# Patient Record
Sex: FEMALE | Race: WHITE
[De-identification: names, ages, dates, MRNs, and addresses within clinical notes are randomized per-mention and may not be internally consistent; named-entity substitution may affect disease eponyms.]

---

## 2019-10-06 ENCOUNTER — HOSPITAL ENCOUNTER (EMERGENCY)
Dept: HOSPITAL 1 - ED | Age: 59
Discharge: HOME | End: 2019-10-06
Payer: COMMERCIAL

## 2019-10-06 VITALS — DIASTOLIC BLOOD PRESSURE: 71 MMHG | SYSTOLIC BLOOD PRESSURE: 122 MMHG

## 2019-10-06 VITALS — HEIGHT: 62 IN | BODY MASS INDEX: 25.58 KG/M2 | WEIGHT: 139 LBS

## 2019-10-06 DIAGNOSIS — N39.0: Primary | ICD-10-CM

## 2019-10-06 DIAGNOSIS — M06.9: ICD-10-CM

## 2019-10-06 LAB
ALBUMIN SERPL-MCNC: 3.8 G/DL (ref 3.4–5)
ALP SERPL-CCNC: 69 U/L (ref 46–116)
ALT SERPL-CCNC: 57 U/L (ref 14–59)
AST SERPL-CCNC: 24 U/L (ref 15–37)
BASOPHILS NFR BLD: 0.2 % (ref 0–2)
BILIRUB SERPL-MCNC: 1 MG/DL (ref 0.2–1)
BUN SERPL-MCNC: 7 MG/DL (ref 7–18)
CALCIUM SERPL-MCNC: 8.7 MG/DL (ref 8.5–10.1)
CHLORIDE SERPL-SCNC: 103 MMOL/L (ref 98–107)
CK MB SERPL-MCNC: < 0.5 NG/ML (ref 0–3.6)
CK SERPL-CCNC: 39 U/L (ref 26–192)
CO2 SERPL-SCNC: 29 MMOL/L (ref 21–32)
CREAT SERPL-MCNC: 0.9 MG/DL (ref 0.6–1)
CRP SERPL-MCNC: < 0.2 MG/DL (ref ?–0.9)
ERYTHROCYTE [DISTWIDTH] IN BLOOD BY AUTOMATED COUNT: 12.6 % (ref 11.5–14.5)
ERYTHROCYTE [SEDIMENTATION RATE] IN BLOOD BY WESTERGREN METHOD: 8 MM/HR (ref 0–30)
GFR SERPLBLD BASED ON 1.73 SQ M-ARVRAT: > 60 ML/MIN
GLUCOSE SERPL-MCNC: 119 MG/DL (ref 74–106)
MICROSCOPIC UR-IMP: YES
PLATELET # BLD: 230 X10^3MCL (ref 130–400)
POTASSIUM SERPL-SCNC: 4.1 MMOL/L (ref 3.5–5.1)
PROT SERPL-MCNC: 6.7 G/DL (ref 6.4–8.2)
RBC # UR STRIP.AUTO: (no result) /UL
SODIUM SERPL-SCNC: 138 MMOL/L (ref 136–145)
T3 SERPL-MCNC: 1.01 NG/ML
T3RU NFR SERPL: 38 % UPTAKE (ref 30–39)
T4 FREE SERPL-MCNC: 0.98 NG/DL (ref 0.76–1.46)
T4 SERPL-MCNC: 6.8 UG/DL (ref 4.7–13.3)
T4/T3 UPTAKE INDEX SERPL: 2.6 UG/DL (ref 1.4–4.5)
UA SPECIFIC GRAVITY: <=1.005 (ref 1–1.03)

## 2019-10-07 ENCOUNTER — HOSPITAL ENCOUNTER (INPATIENT)
Dept: HOSPITAL 1 - ED | Age: 59
LOS: 2 days | Discharge: HOME | DRG: 391 | End: 2019-10-09
Attending: HOSPITALIST | Admitting: HOSPITALIST
Payer: COMMERCIAL

## 2019-10-07 VITALS
WEIGHT: 144.38 LBS | BODY MASS INDEX: 26.57 KG/M2 | WEIGHT: 144.38 LBS | BODY MASS INDEX: 26.57 KG/M2 | HEIGHT: 62 IN | HEIGHT: 62 IN

## 2019-10-07 VITALS — SYSTOLIC BLOOD PRESSURE: 125 MMHG | DIASTOLIC BLOOD PRESSURE: 73 MMHG

## 2019-10-07 VITALS — DIASTOLIC BLOOD PRESSURE: 67 MMHG | SYSTOLIC BLOOD PRESSURE: 125 MMHG

## 2019-10-07 VITALS — SYSTOLIC BLOOD PRESSURE: 117 MMHG | DIASTOLIC BLOOD PRESSURE: 66 MMHG

## 2019-10-07 VITALS — SYSTOLIC BLOOD PRESSURE: 118 MMHG | DIASTOLIC BLOOD PRESSURE: 74 MMHG

## 2019-10-07 DIAGNOSIS — E44.1: ICD-10-CM

## 2019-10-07 DIAGNOSIS — K21.9: ICD-10-CM

## 2019-10-07 DIAGNOSIS — E87.6: ICD-10-CM

## 2019-10-07 DIAGNOSIS — M06.9: ICD-10-CM

## 2019-10-07 DIAGNOSIS — K85.90: ICD-10-CM

## 2019-10-07 DIAGNOSIS — K29.80: Primary | ICD-10-CM

## 2019-10-07 DIAGNOSIS — N39.0: ICD-10-CM

## 2019-10-07 DIAGNOSIS — E83.51: ICD-10-CM

## 2019-10-07 DIAGNOSIS — D63.8: ICD-10-CM

## 2019-10-07 LAB
ALBUMIN SERPL-MCNC: 3.3 G/DL (ref 3.4–5)
ALP SERPL-CCNC: 56 U/L (ref 46–116)
ALT SERPL-CCNC: 42 U/L (ref 14–59)
AMPHETAMINES UR QL SCN: (no result)
AST SERPL-CCNC: 18 U/L (ref 15–37)
BASOPHILS NFR BLD: 0.1 % (ref 0–2)
BASOPHILS NFR BLD: 0.1 % (ref 0–2)
BILIRUB SERPL-MCNC: 0.72 MG/DL (ref 0.2–1)
BUN SERPL-MCNC: 10 MG/DL (ref 7–18)
BUN SERPL-MCNC: 11 MG/DL (ref 7–18)
CALCIUM SERPL-MCNC: 7.8 MG/DL (ref 8.5–10.1)
CALCIUM SERPL-MCNC: 8 MG/DL (ref 8.5–10.1)
CHLORIDE SERPL-SCNC: 104 MMOL/L (ref 98–107)
CHLORIDE SERPL-SCNC: 106 MMOL/L (ref 98–107)
CHOLEST SERPL-MCNC: 144 MG/DL (ref ?–200)
CHOLEST/HDLC SERPL: 4.5 MG/DL
CO2 SERPL-SCNC: 24.3 MMOL/L (ref 21–32)
CO2 SERPL-SCNC: 27.2 MMOL/L (ref 21–32)
CREAT SERPL-MCNC: 0.7 MG/DL (ref 0.6–1)
CREAT SERPL-MCNC: 0.9 MG/DL (ref 0.6–1)
CRP SERPL-MCNC: < 0.2 MG/DL (ref ?–0.9)
ERYTHROCYTE [DISTWIDTH] IN BLOOD BY AUTOMATED COUNT: 11.9 % (ref 11.5–14.5)
ERYTHROCYTE [DISTWIDTH] IN BLOOD BY AUTOMATED COUNT: 13.1 % (ref 11.5–14.5)
GFR SERPLBLD BASED ON 1.73 SQ M-ARVRAT: > 60 ML/MIN
GFR SERPLBLD BASED ON 1.73 SQ M-ARVRAT: > 60 ML/MIN
GLUCOSE SERPL-MCNC: 120 MG/DL (ref 74–106)
GLUCOSE SERPL-MCNC: 131 MG/DL (ref 74–106)
HDLC SERPL-MCNC: 32 MG/DL (ref 40–60)
LIPASE SERPL-CCNC: 1244 IU/L (ref 73–393)
MAGNESIUM SERPL-MCNC: 1.8 MG/DL (ref 1.8–2.4)
MICROSCOPIC UR-IMP: YES
PHOSPHATE SERPL-MCNC: 3.6 MG/DL (ref 2.5–4.9)
PLATELET # BLD: 181 X10^3MCL (ref 130–400)
PLATELET # BLD: 196 X10^3MCL (ref 130–400)
POTASSIUM SERPL-SCNC: 3.4 MMOL/L (ref 3.5–5.1)
POTASSIUM SERPL-SCNC: 4.4 MMOL/L (ref 3.5–5.1)
PROT SERPL-MCNC: 6 G/DL (ref 6.4–8.2)
RBC # UR STRIP.AUTO: (no result) /UL
SODIUM SERPL-SCNC: 136 MMOL/L (ref 136–145)
SODIUM SERPL-SCNC: 138 MMOL/L (ref 136–145)
TRIGL SERPL-MCNC: 98 MG/DL (ref ?–150)
UA SPECIFIC GRAVITY: 1.02 (ref 1–1.03)

## 2019-10-07 PROCEDURE — G0378 HOSPITAL OBSERVATION PER HR: HCPCS

## 2019-10-07 NOTE — NUR
ADMITTED PT FROM ER WITH C/O RLQ PAIN WITH ADMITTING DX ACUTE PANCREATITIS.HI
9/10 TO SHARP PAIN.NO N/V NOTED.PT A/O X4.DENIES HEADACHE OR
DIZZINESS.LEVAQUIN IV FROM ER STILL INFUSING./73 MMHG,HR 84.NPO AT THIS
TIME AND DISCUSSED WITH PT.ADMISSION CARE RENDERED.ORIENTATION TO ROOM AND
CALL BUTTON PROVIDED.WILL CONTINUE TO MONITOR.

## 2019-10-07 NOTE — NUR
PATIENT'S IV FLUIDS NOW AT 85 AND WILL MONITOR SITE FOR TOLERANCE. SO FAR NO
SIGNS OF INFILTRATE NOTED. WILL CONTINUE TO MONITOR.

## 2019-10-07 NOTE — NUR
VERBALIZED RELIEF FROM MORPHINE AND TORADOL GIVEN BY ER NURSE.NO ASE NOTED
FROM LEVAQUIN AND FLAYL IV ATB.HOB KEPT ELEVATED.WILL CONTINUE TO MONITOR.

## 2019-10-07 NOTE — NUR
PT TRANSFERRED TO MED SURG FLOOR ACCOMPANIED BY EMT. NO S/S OF DISTRESS. RESP
E/U. RN AWARE THAT MED INFUSING ON TRANSFER. IV SITE PATENT, PT DENIES PAIN OR
DISCOMFORT.

## 2019-10-07 NOTE — NUR
PT CAME TO ED CO ABDOMINAL PAIN. PT STS SHE WAS SEEN HERE THIS MORNING FOR UTI.
PT STS SHE IS FEELING A SHARP PAIN, IN LOWER RIGHT ABD REGION, RADIATING TO THE
LOWER BACK, RATING THE PAIN 10/10. PT STS SHE WAS GIVEN PAIN MEDICAITON, THIS
DID NOT RELIEVE THE PAIN. PT STS SHE HAS EXPERIENCE N/V. NO S/S OF DISTRESS.
RESP E/U. PAIN UPON PALPATION IN RIGHT LOWER QUADRANT. BOWEL SOUNDS ACTIVE IN
ALL FOUR QUADRANTS. AWAITING MSE. WILL CONTINUE TO MONITOR.

## 2019-10-07 NOTE — NUR
RECEIVED PATIENT ALERT AND ORIENTED TIMES FOUR. PATIENT HAS BEEN ON IV FLUIDS
AND PATIETN FIONA BEREN NPO AS ORDERED. THE LIPASE WAS ELEVATED AND THE
PANCREATIS SHOWS IMFLAMMATION. PATIENT HAS HISTORY OF RHEUMATOID ARTHITIS AND
HAS BORDERLINE DIABTES. SHE HAS HAD RFREQUENT URINARY TRACT INFECTIONS AND
TAKES STEROIDS AT HOME FOR HER RA. SHE RECEIVED LEVAQUIN IN THE ER AND FLAGYL
AS WELL. AT THIS TIME SHE HAS POTASSIUM OF 3.4, LIPASE AT 1244, AND THE CA AT
8.0 AND GLUCOSE . DENIES HISTORY OF DIABETES AND NO KNOWN ALLERGIES
NOTED. VITALS AT THIS TIME AT 97.8, 84, 16, 125/73, 94% ON ROOM AIR.

## 2019-10-07 NOTE — NUR
SEVERAL ATTEMPTS MADE AND ALL WITH BLOOD RETURN, BUT QUICKLY THE SITE STARTED
TO SWELL. STARTED A 24 IN THE HAND AT THAT POINT AND RUNNING AT 20 AND WILL
TITRATE UP TO GET TO THE DESIRED RATE AGAIN. PATIENT TOLERATED WELL. PATIENT
HAS BEEN VOMITING AND HAS BEEN NPO AS INDICATED.

## 2019-10-07 NOTE — NUR
REPORT RECEIVED FROM DAY SHIFT RN. PATIENT WAS SEEN RESTING COMFORTABLY IN
BED. NO DISTRESS NOTED. BREATHING EVEN AND UNLABORED ON ROOM AIR. NO SOB OR
RESP DISTRESS NOTED. IV TO THE LH, 24G, PATENT AND INTACT. NO REDNESS OR
SWELLING NOTED. INFUSING WELL. DENIES N/V. DENIES CHEST PAIN/PRESSURE. NO C/O
PAIN. COMFORT AND SAFETY MEASURES IN PLACE. CALL LIGHT IS WITHIN REACH. BED IS
LOCKED AND IN THE LOWEST POSITION. SIDE RAILS UP X2. WILL CONTINUE TO MONITOR.

## 2019-10-07 NOTE — NUR
INCREASED THE IV TO 65 AND WILL CONTINUE TO MONITOR. PATIENT HAS NOT HAVE ANY
FURTHER NAUSEA AT [facility]. WILL CONTINUE TO MONITOR.

## 2019-10-07 NOTE — NUR
PT VOMITED X1 TO FOOD CONTENT VOMITUS AFTER MORPHIEN AND TORADOL
ADMINISTRATION.HOB ELEVATED .NO ASPIRATION NOTED.GOOD ORAL CARE PROVIDED.ALL
NEEDS MET.WILL CONTINUE TO MONITOR.

## 2019-10-08 VITALS — DIASTOLIC BLOOD PRESSURE: 72 MMHG | SYSTOLIC BLOOD PRESSURE: 125 MMHG

## 2019-10-08 VITALS — DIASTOLIC BLOOD PRESSURE: 64 MMHG | SYSTOLIC BLOOD PRESSURE: 115 MMHG

## 2019-10-08 VITALS — SYSTOLIC BLOOD PRESSURE: 105 MMHG | DIASTOLIC BLOOD PRESSURE: 59 MMHG

## 2019-10-08 VITALS — DIASTOLIC BLOOD PRESSURE: 69 MMHG | SYSTOLIC BLOOD PRESSURE: 108 MMHG

## 2019-10-08 LAB
BASOPHILS NFR BLD: 0.2 % (ref 0–2)
BUN SERPL-MCNC: 5 MG/DL (ref 7–18)
CALCIUM SERPL-MCNC: 7.7 MG/DL (ref 8.5–10.1)
CHLORIDE SERPL-SCNC: 111 MMOL/L (ref 98–107)
CO2 SERPL-SCNC: 27 MMOL/L (ref 21–32)
CREAT SERPL-MCNC: 0.9 MG/DL (ref 0.6–1)
ERYTHROCYTE [DISTWIDTH] IN BLOOD BY AUTOMATED COUNT: 12.8 % (ref 11.5–14.5)
GFR SERPLBLD BASED ON 1.73 SQ M-ARVRAT: > 60 ML/MIN
GLUCOSE SERPL-MCNC: 91 MG/DL (ref 74–106)
MAGNESIUM SERPL-MCNC: 1.7 MG/DL (ref 1.8–2.4)
PHOSPHATE SERPL-MCNC: 3.2 MG/DL (ref 2.5–4.9)
PLATELET # BLD: 182 X10^3MCL (ref 130–400)
POTASSIUM SERPL-SCNC: 3.8 MMOL/L (ref 3.5–5.1)
SODIUM SERPL-SCNC: 143 MMOL/L (ref 136–145)

## 2019-10-08 NOTE — NUR
NP DENNY AT BEDSIDE, SPOKE WITH PATIENT REGARDING PLAN OF CARE. PATIENT AWARE
DIET WILL BE ADVANCED AS TOLERATED AND LABS WILL BE MONITORED. PATIENT
VERBALIZED UNDERSTAND. ALL NEEDS MET AT THIS TIME. WILL CONTINUE TO MONITORED.

## 2019-10-08 NOTE — NUR
RECEIVED REPORT FROM DAY SHIFT RN. PT SITTING UP IN BED. AA&O X4. NO SOB
NOTED ON ROOM AIR. NO C/O N/V/ABD PAIN AT THIS TIME. IV TO LEFT HAND, INTACT.
SAFETY MEASURES IN PLACE. BED IN LOWEST POSITION. SIDE RAILS UP X2. INSTRUCTED
PT TO USE THE CALL LIGHT FOR ASSISTANCE. CALL LIGHT WITHIN REACH.

## 2019-10-08 NOTE — NUR
PATIENT RESTING IN BED, WATCHING TV. NO ACUTE DISTRESS NOTED. PATIENT DENIES
PAIN. ALL QUESTIONS AND CONCERNS ADDRESSED. NS IV INFUSING TO LH AT 150ML/HR,
IV SITE CDI & PATENT. CALL LIGHT WITHIN REACH, WILL ENDORSE REPORT.

## 2019-10-08 NOTE — NUR
C/O 6/10 RLQ PAIN AND INDIGESTION/ACID REFLUX. PRN NORCO AND TUMS ADMINISTERED
AS PRESCRIBED. MED EDUCATION GIVEN. NO DISTRESS NOTED. BREATHING EVEN AND
UNLABORED ON ROOM AIR. SAFETY MEASURES IN PLACE. CALL LIGHT IS WITHIN REACH.
WILL CONTINUE TO MONITOR

## 2019-10-08 NOTE — NUR
RECEIVED PATIENT RESTING IN BED, NO ACUTE DISTRESS NOTED. PATIENT STATES SHE
HAS BACK DISCOMFORT TO 05/10, PATIENT STATES DISCOMFORT IS TOLERABLE. PATIENT
DENIES NAUSEA & VOMITTING, ON CLEAR LIQUID DIET. PATIENT IS AMBULATORY, NO
WEAKNESS NOTED. NS IV INFUSING TO LEFT HAND AT 150ML/HR, IV SITE CDI & PATENT,
NO S/S OF INFILTRATION. CALL LIGHT WITHIN REACH, BED IN LOW POSITION WILL
CONTINUE TO MONITOR FOR CHANGES.

## 2019-10-08 NOTE — NUR
RESTING IN BED COMFORTABLY. NO DISTRESS NOTED. DENIES PAIN AND N/V. IVF
INFUSING WELL. BREATHING EVEN AND UNLABORED ON ROOM AIR. SAFETY MEASURES IN
PLACE. CALL LIGHT IS WITHIN REACH. WILL CONTINUE TO MONITOR.

## 2019-10-08 NOTE — NUR
BLADDER SCAN DONE AND IT SHOWS AROUND 600ML OF URINE IN BLADDER. PATIENT
STATES SHE DOES NOT HAVE TO VOID AT THIS TIME. WILL CALL DR NAIR AND NOTIFY
HIM.

## 2019-10-08 NOTE — NUR
RESTED IN INTERVALS THROUGHTOUT THE NIGHT. NO ACUTE CHANGES NOTED. NO DISTRESS
NOTED. BREATHING EVEN AND UNLABORED ON ROOM AIR. NO SOB NOTED. DENIES CHEST
PAIN/ PRESSURE. NO C/O PAIN AT THIS TIME. IVF INFUSING WELL. PATENT AND
INTACT. SAFETY MEASURES IN PLACE. CALL LIGHT IS WITHIN REACH. WILL ENDORSE
CARE TO DAY SHIFT RN.

## 2019-10-09 VITALS — SYSTOLIC BLOOD PRESSURE: 117 MMHG | DIASTOLIC BLOOD PRESSURE: 70 MMHG

## 2019-10-09 VITALS — DIASTOLIC BLOOD PRESSURE: 70 MMHG | SYSTOLIC BLOOD PRESSURE: 117 MMHG

## 2019-10-09 VITALS — DIASTOLIC BLOOD PRESSURE: 63 MMHG | SYSTOLIC BLOOD PRESSURE: 127 MMHG

## 2019-10-09 LAB
BASOPHILS NFR BLD: 0.4 % (ref 0–2)
BUN SERPL-MCNC: 5 MG/DL (ref 7–18)
CALCIUM SERPL-MCNC: 8.1 MG/DL (ref 8.5–10.1)
CHLORIDE SERPL-SCNC: 109 MMOL/L (ref 98–107)
CO2 SERPL-SCNC: 26.1 MMOL/L (ref 21–32)
CREAT SERPL-MCNC: 0.7 MG/DL (ref 0.6–1)
ERYTHROCYTE [DISTWIDTH] IN BLOOD BY AUTOMATED COUNT: 15.6 % (ref 11.5–14.5)
GFR SERPLBLD BASED ON 1.73 SQ M-ARVRAT: > 60 ML/MIN
GLUCOSE SERPL-MCNC: 93 MG/DL (ref 74–106)
MAGNESIUM SERPL-MCNC: 1.8 MG/DL (ref 1.8–2.4)
PLATELET # BLD: 176 X10^3MCL (ref 130–400)
POTASSIUM SERPL-SCNC: 3.5 MMOL/L (ref 3.5–5.1)
SODIUM SERPL-SCNC: 144 MMOL/L (ref 136–145)

## 2019-10-09 NOTE — NUR
PT RESTED IN LONG INTERVALS DURING SHIFT. BREATHING EVEN AND UNLABORED ON ROOM
AIR. TUMS GIVEN FOR GERD. NO C/O N/V/ABD PAIN. IV TO RFA, PATENT AND INTACT.
SAFETY MEASURES MAINTAINED. ALL NEEDS ATTENDED TO. CALL LIGHT WITHIN REACH.
WILL CONTINUE TO MONITOR AND ENDORSE CONTINUITY OF CARE TO ONCOMING RN.

## 2019-10-09 NOTE — NUR
PATIENT IN BED AND FINISHED LUNCH. NO SIGNS OF DISTRESS. EDUCATED PATIENT ON
DISCHARGE INSTRUCTIONS. PATIENT AWARE THAT SHE IS DISCHARGED AND CURRENTLY
WAITING FOR HER RIDE. IV DISCONTINUED ON RIGHT FOREARM. NO S/S OF INFILTRATION
NOTED. WILL CONTINUE TO MONITOR. CALL LIGHT WITHIN REACH.

## 2019-10-09 NOTE — NUR
NP DENNY NOTIFIED OF ABNORMAL LAB VALUES HGB: 8.8, HCT 27, BUN: 60.0 AND
CREATININE: 5.9. PATIENT MADE AWARE. NO NEW ORDERS AT THIS TIME. WILL CONTINUE
TO MONITOR. CALL LIGHT WITHIN REACH.

## 2019-10-09 NOTE — NUR
REPORT RECEIVED FROM NIGHT SHIFT RNVERNELL. PATIENT FOUND IN BED WATCHING TV.
ALERT AND ORIENTED X 4. NO SIGNS OF DISTRESS NOTED. WILL CONTINUE TO MONITOR.
CALL LIGHT WITHIN REACH.

## 2020-03-20 ENCOUNTER — HOSPITAL ENCOUNTER (EMERGENCY)
Dept: HOSPITAL 1 - ED | Age: 60
Discharge: HOME | End: 2020-03-20
Payer: COMMERCIAL

## 2020-03-20 VITALS
HEIGHT: 62 IN | WEIGHT: 135 LBS | HEIGHT: 62 IN | WEIGHT: 135 LBS | BODY MASS INDEX: 24.84 KG/M2 | BODY MASS INDEX: 24.84 KG/M2

## 2020-03-20 VITALS — SYSTOLIC BLOOD PRESSURE: 136 MMHG | DIASTOLIC BLOOD PRESSURE: 62 MMHG
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
## 2020-03-20 DIAGNOSIS — S93.402A: ICD-10-CM
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
## 2020-03-20 DIAGNOSIS — S42.91XA: Primary | ICD-10-CM
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
